# Patient Record
Sex: FEMALE | Race: WHITE | NOT HISPANIC OR LATINO | URBAN - METROPOLITAN AREA
[De-identification: names, ages, dates, MRNs, and addresses within clinical notes are randomized per-mention and may not be internally consistent; named-entity substitution may affect disease eponyms.]

---

## 2019-10-26 ENCOUNTER — OFFICE VISIT (OUTPATIENT)
Dept: URGENT CARE | Facility: CLINIC | Age: 34
End: 2019-10-26
Payer: COMMERCIAL

## 2019-10-26 VITALS
OXYGEN SATURATION: 98 % | SYSTOLIC BLOOD PRESSURE: 133 MMHG | HEART RATE: 99 BPM | BODY MASS INDEX: 25.66 KG/M2 | RESPIRATION RATE: 15 BRPM | TEMPERATURE: 98 F | HEIGHT: 65 IN | WEIGHT: 154 LBS | DIASTOLIC BLOOD PRESSURE: 86 MMHG
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
## 2019-10-26 DIAGNOSIS — H60.12 CELLULITIS OF LEFT EAR: ICD-10-CM

## 2019-10-26 DIAGNOSIS — Z91.038 ALLERGIC REACTION TO INSECT BITE: ICD-10-CM

## 2019-10-26 PROCEDURE — 96372 PR INJECTION,THERAP/PROPH/DIAG2ST, IM OR SUBCUT: ICD-10-PCS | Mod: S$GLB,,, | Performed by: EMERGENCY MEDICINE

## 2019-10-26 PROCEDURE — 99203 OFFICE O/P NEW LOW 30 MIN: CPT | Mod: 25,S$GLB,, | Performed by: EMERGENCY MEDICINE

## 2019-10-26 PROCEDURE — 99203 PR OFFICE/OUTPT VISIT, NEW, LEVL III, 30-44 MIN: ICD-10-PCS | Mod: 25,S$GLB,, | Performed by: EMERGENCY MEDICINE

## 2019-10-26 PROCEDURE — 96372 THER/PROPH/DIAG INJ SC/IM: CPT | Mod: S$GLB,,, | Performed by: EMERGENCY MEDICINE

## 2019-10-26 RX ORDER — BETAMETHASONE SODIUM PHOSPHATE AND BETAMETHASONE ACETATE 3; 3 MG/ML; MG/ML
9 INJECTION, SUSPENSION INTRA-ARTICULAR; INTRALESIONAL; INTRAMUSCULAR; SOFT TISSUE
Status: COMPLETED | OUTPATIENT
Start: 2019-10-26 | End: 2019-10-26

## 2019-10-26 RX ORDER — DOXYCYCLINE 100 MG/1
100 CAPSULE ORAL EVERY 12 HOURS
Qty: 20 CAPSULE | Refills: 0 | Status: SHIPPED | OUTPATIENT
Start: 2019-10-26 | End: 2019-11-05

## 2019-10-26 RX ORDER — PREDNISONE 20 MG/1
20 TABLET ORAL DAILY
Qty: 4 TABLET | Refills: 0 | Status: SHIPPED | OUTPATIENT
Start: 2019-10-26 | End: 2019-10-30

## 2019-10-26 RX ORDER — DROSPIRENONE AND ETHINYL ESTRADIOL 0.02-3(28)
1 KIT ORAL DAILY
COMMUNITY

## 2019-10-26 RX ADMIN — BETAMETHASONE SODIUM PHOSPHATE AND BETAMETHASONE ACETATE 9 MG: 3; 3 INJECTION, SUSPENSION INTRA-ARTICULAR; INTRALESIONAL; INTRAMUSCULAR; SOFT TISSUE at 12:10

## 2019-10-26 NOTE — PATIENT INSTRUCTIONS
Ross Richard MD  Go to the Emergency Department for any problems  Call your PCP for follow up next available.    Infected Insect Bite or Sting    When an insect stings you, it injects venom. When an insect bites you, it does not. Stings and bites may cause a local reaction. Or they may cause a reaction that affects your whole body. Bites and stings may become infected. Signs of infection include redness, warmth, pain, drainage of pus, and swelling. Infections will need treatment with antibiotics and should get better over the next 10 days.  Home care  The following will help you care for your bite or sting at home:  · If a stinger is still in your skin, it will need to be removed. Don't use tweezers. Gently scrape the stinger from the side with a firm object such as the side of a credit card. This will loosen it and remove it from your skin.  · If itching is a problem, applying ice packs to the sting area will help.  · Wash the area with soap and water at least 3 times a day. Apply a topical antibiotic cream or ointment.  · You can use an over-the counter antihistamine unless your doctor has given you a prescription antihistamine. You may use antihistamines to reduce itching if large areas of the skin are involved. Use lower doses during the daytime and higher doses at bedtime since the drug may make you sleepy. Don't use an antihistamine if you have glaucoma or if you are a man with trouble urinating due to an enlarged prostate. Some antihistamines cause less drowsiness and are a good choice for daytime use.  · If oral antibiotics have been prescribed, be sure to take them as directed until they are all finished.  · You may use over-the-counter pain medicine to control pain, unless another pain medicine was prescribed. Talk with your doctor before using acetaminophen or ibuprofen if you have chronic liver or kidney disease. Also talk with your doctor if you have ever had a stomach ulcer or gastrointestinal  bleeding.  Follow-up care  Follow up with your healthcare provider, or as advised if you don't get better over the next 2 days or if your symptoms get worse.  Call 911  Call 911 if any of these occur:  · Swelling of the face, eyelids, mouth, throat, or tongue  · Difficulty swallowing or breathing  When to seek medical advice  Call your healthcare provider right away if any of these occur:  · Spreading areas of redness or swelling  · Fever of 100.4°F (38°C) or higher, or as directed by your healthcare provider  · Increased local pain  · Headache, fever, chills, muscle or joint aching, or vomiting,  · New rash  Date Last Reviewed: 10/1/2016  © 3142-7884 [email or web address]. [address] [place] [address], [place] [address]. All rights reserved. This information is not intended as a substitute for professional medical care. Always follow your healthcare professional's instructions.        Insect Sting: Local Reaction   You have been stung or bitten by an insect. The insects venom or body fluid is causing your skin to react in the area where you were stung or bitten. This often causes redness, itching and swelling. This reaction will fade over a few hours, but it can last a few days. An insect bite or sting can become infected 1 to 3 days later, so watch for the signs below. Sometimes it is hard to tell the difference between a local reaction to the insect bite or sting and an early infection, so you may be given antibiotics.  Common insect stings causing problems are from wasps, bees, yellow jackets, and hornets. Common bites are from spiders, mosquitoes, fleas, or ticks. Other types of insects may be more common in different parts of the country or world.  Most people think of allergic reactions when someone has a rash or itchy skin. Symptoms can include:  · Rash, hives, redness, welts, or blisters  · Itching, burning, stinging, or pain  · Swelling around the sting area.  Sometimes swelling spreads to other  areas.  Home care  Medicines  The healthcare provider may prescribe medicines to relieve swelling, itching, and pain. Follow the providers instructions when taking these medicines.  · If you had a severe reaction, the provider may prescribe an epinephrine kit. Epinephrine will stop an allergic reaction from getting worse. Before you leave the hospital, be sure that you understand when and how to use this medicine.  · Diphenhydramine is an oral antihistamine available at drugstores and groceries. Unless a prescription antihistamine was given, you can use this medicine to reduce itching if large areas of the skin are involved. The medicine may make you sleepy, so be careful using it in the daytime or when going to school, working, or driving. Dont use diphenhydramine if you have glaucoma or if you are a man with trouble urinating because of an enlarged prostate. Other antihistamines cause less drowsiness and are good choices for daytime use. Ask your pharmacist for suggestions.  · Dont use diphenhydramine cream on your skin. In some people it can cause additional reaction and make you allergic to this medicine.  · Calamine lotion or oatmeal baths sometimes help with itching.  · You may use acetaminophen or ibuprofen to control pain, unless another pain medicine was prescribed. Talk with your healthcare provider before using these medicines if you have chronic liver or kidney disease. Also talk with your provider if youve had a stomach ulcer or GI bleeding.  General care    · If itching is a problem, dont take hot showers or baths. Stay out of direct sunlight. These heat up your skin and will make the itching worse.  · Use an ice pack to reduce local areas of redness and itching. You can make your own ice pack by putting ice cubes in a bag that seals and wrapping it in a thin towel. Dont put the ice directly on your skin, because it can damage the skin.  · Try not to scratch any affected areas and damage the  skin. This will help prevent an infection.  · If oral antibiotics were prescribed, be sure to take them until finished.  Preventing future reactions  · Future reactions could be worse than this one, so try to stay away from places where you might be stung again.  · Be aware that honeybees nest in trees. Wasps and yellow jackets nest in the ground, trees, or roof eaves.  · If you are stung by a honeybee, a stinger will remain in your skin. Wasps, yellow jackets, and hornets dont leave a stinger behind. Move away from the nest area right away. The stinger of a honeybee releases a substance that will attract other bees to you. Once you are away from the nest, then remove the stinger as quickly as possible.  · After any sting, you may apply ice and take diphenhydramine or another antihistamine. If you develop any of the warning signs below, seek help right away.  · If you are at high risk for another sting, or if your reaction included dizziness, fainting, or trouble breathing or swallowing, ask your doctor for an insect allergy kit.  · Remove any ticks on the skin with a set of fine tweezers.  the tick as close to the skin as possible. Pull back gently but firmly. Use an even, steady pressure. Dont jerk or twist. Dont squeeze, crush, or puncture the body of the tick. The bodily fluids may contain infection-causing germs. Dont use a smoldering match or cigarette, nail polish, petroleum jelly, liquid soap, or kerosene. These may irritate the tick. If any mouthparts of the tick remain in the skin, these should be left alone. They will fall off on their own. Trying to remove these parts may damage the skin unless they can be removed very easily. After the tick is removed, wash the bite area with rubbing alcohol, iodine, or soap and water.  Follow-up care  Follow up with your doctor in 2 days, or as advised, if your symptoms dont start to get better.  Call 911  Call 911 if any of these occur:  · Trouble breathing  or swallowing, or wheezing  · New or worsening swelling in the mouth, throat, or tongue  · Hoarse voice or trouble speaking  · Confused  · Very drowsy or trouble awakening  · Fainting or loss of consciousness  · Rapid heart rate  · Low blood pressure  · Feeling of doom  · Nausea, vomiting, abdominal pain, or diarrhea  · Vomiting blood, or large amounts of blood in stool  · Seizure  When to seek medical advice  Call your healthcare provider right away if any of these occur:  · Spreading areas of itching, redness or swelling  · New or worse swelling in the face, eyelids, or  lips  · Dizziness or weakness  Also call your provider right away if you have signs of infection:  · Spreading redness  · Increased pain or swelling  · Fever of 100.4°F (38°C) or higher, or as directed by your healthcare provider  · Colored fluid draining from the sting area   Date Last Reviewed: 10/1/2016  © 8110-1512 [email or web address] [address] [place] [address]. All rights reserved. This information is not intended as a substitute for professional medical care. Always follow your healthcare professional's instructions.        Pierced-Ear Infection  A pierced ear can get swollen and sore. This is often due to an infection. The possible causes for this infection include:  · Frequently touching the earlobes with dirty hands  · Ear-piercing equipment was not sterile  · Earring posts were not sterile  · Posts are too short or the clasp is pressed on too tightly  · Posts are rough and cause irritation of the pierced area  Mild infections can be treated at home as described below. For more serious infections, you may need oral antibiotics.  Home care  The following guidelines will help you care for your ears:  · Wash your hands before touching your ears.  · Leave the posts in place unless told otherwise by your healthcare provider.  · You may use over-the-counter medicine for pain or fever as directed, based on your age and  weight. Use this unless another medicine was prescribed. Note: Talk with your provider before using these medicines if you have chronic liver or kidney disease, or ever had a stomach ulcer or GI (gastrointestinal) bleeding.  · Finish any antibiotics you were given.  Prevention  · Use only 14-karat gold or stainless steel posts.  · Don't touch the earrings except when putting them on or taking them off.  · Always clean the earrings, posts, and your earlobe with soap and water before putting in earrings.  · Keep the clasp loose to allow space on either side of your earlobe.  · After you have worn the earrings for at least 6 weeks, remove them at bedtime each night. This allows the pierced part of your ear to be exposed to air for part of each day.  · Avoid nickel posts. These can cause an allergic reaction with itching and swelling. This can lead to an infection.  Follow-up care  Follow up with your healthcare provider, or as advised.  When to seek medical advice  Call your healthcare provider right away if any of these occur:  · Increased swelling or redness of your earlobe  · Redness and swelling don't start to get better after 2 days of treatment  · Fluid draining from your earlobe  · Not able to see the front or back side of the earrings due to swelling  For fever, call your provider right away if:  · You have a fever over 100.4°F (38.0°C) or higher, for more than 2 days after starting treatment, or as directed by your provider  · Your child is younger than 12 weeks and has a fever of 100.4°F (38°C) or higher. Your baby may need to be seen by his or her healthcare provider.  · Your child has repeated fevers above 104°F (40°C) at any age.  · Your child is younger than 2 years old and the fever continues for more than 24 hours. Or your child is 2 years old and older and the fever continues for more than 3 days.  Date Last Reviewed: 8/1/2016  © 1908-8334 [employer]. [address] [place] [address], [place]  [address]. All rights reserved. This information is not intended as a substitute for professional medical care. Always follow your healthcare professional's instructions.

## 2019-10-26 NOTE — PROGRESS NOTES
"Subjective:       Patient ID: Carissa Alvarado is a 34 y.o. female.    Vitals:  height is 5' 5" (1.651 m) and weight is 69.9 kg (154 lb). Her temperature is 97.7 °F (36.5 °C). Her blood pressure is 133/86 and her pulse is 99. Her respiration is 15 and oxygen saturation is 98%.     Chief Complaint: Insect Bite (bed bites)    Pt. States bed bugs from a hotel on Thursday, itchy, left ear lobe with some increased redness/swelling/yellow drainage, is not pregnant, OK with steroid IM/po prn/doxycycline prn, bactoban applied in UC, NOC.    Insect Bite   This is a new problem. The current episode started in the past 7 days. The problem has been gradually worsening. Associated symptoms include a rash. Pertinent negatives include no arthralgias, chest pain, chills, congestion, coughing, fatigue, fever, headaches, joint swelling, myalgias, nausea, sore throat, vertigo, vomiting or weakness. Nothing aggravates the symptoms. The treatment provided no relief.       Constitution: Negative for chills, fatigue and fever.   HENT: Negative for congestion and sore throat.    Neck: Negative for painful lymph nodes.   Cardiovascular: Negative for chest pain and leg swelling.   Eyes: Negative for double vision and blurred vision.   Respiratory: Negative for cough and shortness of breath.    Gastrointestinal: Negative for nausea, vomiting and diarrhea.   Genitourinary: Negative for dysuria, frequency, urgency and history of kidney stones.   Musculoskeletal: Negative for joint pain, joint swelling, muscle cramps and muscle ache.   Skin: Positive for rash. Negative for color change, pale and bruising.   Allergic/Immunologic: Negative for seasonal allergies.   Neurological: Negative for dizziness, history of vertigo, light-headedness, passing out and headaches.   Hematologic/Lymphatic: Negative for swollen lymph nodes.   Psychiatric/Behavioral: Negative for nervous/anxious, sleep disturbance and depression. The patient is not " nervous/anxious.        Objective:      Physical Exam   Constitutional: She is oriented to person, place, and time. She appears well-developed and well-nourished.   HENT:   Head: Normocephalic.       Ears:    Nose: Nose normal.   Left ear lobe red/swollen/scant yellow dried drainage, itchy   Neck: Normal range of motion. Neck supple.   Cardiovascular: Normal rate, regular rhythm and normal heart sounds.   Pulmonary/Chest: Breath sounds normal.   Musculoskeletal: Normal range of motion.   Neurological: She is alert and oriented to person, place, and time.   Skin:   Scattered areas of oval erythema, a few with scant yellow dried drainage, itchy, non fluctuant   Psychiatric: She has a normal mood and affect. Her behavior is normal.         Assessment:       1. Bedbug bite, initial encounter    2. Allergic reaction to insect bite    3. Cellulitis of left ear        Plan:         Bedbug bite, initial encounter  -     doxycycline (VIBRAMYCIN) 100 MG Cap; Take 1 capsule (100 mg total) by mouth every 12 (twelve) hours. Generic or equivalent alternative OK.  Start in 1-3 days if not improved. for 10 days  Dispense: 20 capsule; Refill: 0    Allergic reaction to insect bite  -     betamethasone acetate-betamethasone sodium phosphate injection 9 mg  -     predniSONE (DELTASONE) 20 MG tablet; Take 1 tablet (20 mg total) by mouth once daily. Start in 1-2 days if not improved for 4 days  Dispense: 4 tablet; Refill: 0    Cellulitis of left ear  -     doxycycline (VIBRAMYCIN) 100 MG Cap; Take 1 capsule (100 mg total) by mouth every 12 (twelve) hours. Generic or equivalent alternative OK.  Start in 1-3 days if not improved. for 10 days  Dispense: 20 capsule; Refill: 0        Ross Richard MD  Go to the Emergency Department for any problems  Call your PCP for follow up next available.

## 2019-10-29 ENCOUNTER — TELEPHONE (OUTPATIENT)
Dept: URGENT CARE | Facility: CLINIC | Age: 34
End: 2019-10-29